# Patient Record
Sex: FEMALE | Race: WHITE | NOT HISPANIC OR LATINO | Employment: UNEMPLOYED | URBAN - METROPOLITAN AREA
[De-identification: names, ages, dates, MRNs, and addresses within clinical notes are randomized per-mention and may not be internally consistent; named-entity substitution may affect disease eponyms.]

---

## 2018-05-18 ENCOUNTER — OFFICE VISIT (OUTPATIENT)
Dept: URGENT CARE | Facility: PHYSICIAN GROUP | Age: 73
End: 2018-05-18

## 2018-05-18 VITALS
HEART RATE: 78 BPM | DIASTOLIC BLOOD PRESSURE: 62 MMHG | TEMPERATURE: 98.2 F | HEIGHT: 64 IN | BODY MASS INDEX: 22.88 KG/M2 | WEIGHT: 134 LBS | OXYGEN SATURATION: 98 % | RESPIRATION RATE: 16 BRPM | SYSTOLIC BLOOD PRESSURE: 126 MMHG

## 2018-05-18 DIAGNOSIS — Z76.0 MEDICATION REFILL: ICD-10-CM

## 2018-05-18 DIAGNOSIS — N39.0 ACUTE UTI: ICD-10-CM

## 2018-05-18 LAB
APPEARANCE UR: NORMAL
BILIRUB UR STRIP-MCNC: NORMAL MG/DL
COLOR UR AUTO: YELLOW
GLUCOSE UR STRIP.AUTO-MCNC: NORMAL MG/DL
KETONES UR STRIP.AUTO-MCNC: NORMAL MG/DL
LEUKOCYTE ESTERASE UR QL STRIP.AUTO: NORMAL
NITRITE UR QL STRIP.AUTO: NORMAL
PH UR STRIP.AUTO: 5 [PH] (ref 5–8)
PROT UR QL STRIP: NORMAL MG/DL
RBC UR QL AUTO: NORMAL
SP GR UR STRIP.AUTO: 1.02
UROBILINOGEN UR STRIP-MCNC: NORMAL MG/DL

## 2018-05-18 PROCEDURE — 99203 OFFICE O/P NEW LOW 30 MIN: CPT | Mod: 25 | Performed by: NURSE PRACTITIONER

## 2018-05-18 PROCEDURE — 81002 URINALYSIS NONAUTO W/O SCOPE: CPT | Performed by: NURSE PRACTITIONER

## 2018-05-18 RX ORDER — PHENAZOPYRIDINE HYDROCHLORIDE 100 MG/1
100 TABLET, FILM COATED ORAL 3 TIMES DAILY PRN
Qty: 6 TAB | Refills: 0 | Status: SHIPPED | OUTPATIENT
Start: 2018-05-18

## 2018-05-18 RX ORDER — PHENAZOPYRIDINE HYDROCHLORIDE 100 MG/1
100 TABLET, FILM COATED ORAL 3 TIMES DAILY PRN
Qty: 6 TAB | Refills: 0 | Status: SHIPPED | OUTPATIENT
Start: 2018-05-18 | End: 2018-05-18 | Stop reason: SDUPTHER

## 2018-05-18 RX ORDER — NITROFURANTOIN 25; 75 MG/1; MG/1
100 CAPSULE ORAL 2 TIMES DAILY
Qty: 14 CAP | Refills: 0 | Status: SHIPPED | OUTPATIENT
Start: 2018-05-18

## 2018-05-18 RX ORDER — NITROFURANTOIN 25; 75 MG/1; MG/1
100 CAPSULE ORAL 2 TIMES DAILY
Qty: 14 CAP | Refills: 0 | Status: SHIPPED | OUTPATIENT
Start: 2018-05-18 | End: 2018-05-18 | Stop reason: SDUPTHER

## 2018-05-18 RX ORDER — FUROSEMIDE 20 MG/1
20 TABLET ORAL DAILY
Qty: 5 TAB | Refills: 0 | Status: SHIPPED | OUTPATIENT
Start: 2018-05-18

## 2018-05-18 RX ORDER — SPIRONOLACTONE 50 MG/1
50 TABLET, FILM COATED ORAL DAILY
Qty: 5 TAB | Refills: 3 | Status: SHIPPED | OUTPATIENT
Start: 2018-05-18 | End: 2018-05-18 | Stop reason: SDUPTHER

## 2018-05-18 RX ORDER — SPIRONOLACTONE 50 MG/1
50 TABLET, FILM COATED ORAL DAILY
Qty: 5 TAB | Refills: 3 | Status: SHIPPED | OUTPATIENT
Start: 2018-05-18

## 2018-05-18 RX ORDER — FUROSEMIDE 20 MG/1
20 TABLET ORAL 2 TIMES DAILY
COMMUNITY
End: 2018-05-18

## 2018-05-18 RX ORDER — FUROSEMIDE 20 MG/1
20 TABLET ORAL DAILY
Qty: 5 TAB | Refills: 0 | Status: SHIPPED | OUTPATIENT
Start: 2018-05-18 | End: 2018-05-18 | Stop reason: SDUPTHER

## 2018-05-18 ASSESSMENT — ENCOUNTER SYMPTOMS
CHILLS: 0
BACK PAIN: 0
FEVER: 0
HEADACHES: 0
FLANK PAIN: 0
NAUSEA: 0
WEAKNESS: 0
VOMITING: 0
ABDOMINAL PAIN: 0
MYALGIAS: 0

## 2018-05-19 NOTE — PROGRESS NOTES
"Subjective:      Jerome Herrera is a 73 y.o. female who presents with Painful Urination            Medications, Allergies and Prior Medical Hx reviewed and updated in [facility].with patient/family today       Patient currently lives in [place]. She is here visiting for a graduation. She has 2 issues today. One is a urinary tract infection symptoms. The second issue is she Left some of her medications at home. Medications are from [place] and in [place]. States she had a heart attack. 4 weeks ago. This time is to bowel and her chest felt up with fluid.      Dysuria    This is a new problem. The current episode started 1 to 4 weeks ago (4 wks). The problem has been waxing and waning. The quality of the pain is described as burning. The pain is at a severity of 8/10. There has been no fever. Associated symptoms include frequency and urgency. Pertinent negatives include no chills, flank pain, hematuria, nausea or vomiting. Her past medical history is significant for catheterization.       Review of Systems   Constitutional: Negative for chills, fever and malaise/fatigue.   Gastrointestinal: Negative for abdominal pain, nausea and vomiting.   Genitourinary: Positive for dysuria, frequency and urgency. Negative for flank pain and hematuria.   Musculoskeletal: Negative for back pain and myalgias.   Neurological: Negative for weakness and headaches.          Objective:     /62   Pulse 78   Temp 36.8 °C (98.2 °F)   Resp 16   Ht 1.626 m (5' 4\")   Wt 60.8 kg (134 lb)   SpO2 98%   BMI 23.00 kg/m²      Physical Exam   Constitutional: She appears well-developed and well-nourished.   HENT:   Head: Normocephalic and atraumatic.   Eyes: Pupils are equal, round, and reactive to light.   Cardiovascular: Normal rate, regular rhythm and normal heart sounds.    Pulmonary/Chest: Effort normal and breath sounds normal. No respiratory distress.   Abdominal: Soft. She exhibits no distension. There is no tenderness.   Musculoskeletal: " She exhibits edema (+1 bilateral lower legs).   Neurological: She is alert.   Awake, alert, answering questions appropriately, moving all extremeties   Skin: Skin is warm and dry. Capillary refill takes less than 2 seconds.   Vitals reviewed.              Assessment/Plan:     1. Medication refill  furosemide (LASIX) 20 MG Tab    spironolactone (ALDACTONE) 50 MG Tab    DISCONTINUED: spironolactone (ALDACTONE) 50 MG Tab    DISCONTINUED: furosemide (LASIX) 20 MG Tab   2. Acute UTI  nitrofurantoin monohydr macro (MACROBID) 100 MG Cap    phenazopyridine (PYRIDIUM) 100 MG Tab    POCT Urinalysis    DISCONTINUED: nitrofurantoin monohydr macro (MACROBID) 100 MG Cap    DISCONTINUED: phenazopyridine (PYRIDIUM) 100 MG Tab       Results for KYLEE VERDIN (MRN [id number]) as of 5/18/2018 20:30   5/18/2018 17:20   POC Color yellow   POC Appearance cloudy   POC Specific Gravity 1.020   POC Urine PH 5.0   POC Glucose neg   POC Ketones neg   POC Protein neg   POC Nitrites neg   POC Leukocyte Esterase moderate   POC Blood neg   POC Bilirubin neg   POC Urobiligen neg       Patient was placed on Macrobid due to allergies to sulfa and penicillins. And recent cardiac issues..    Patient has to medication sheet like refilled both her research On the Internet. One was Lasix and spironolactone. The other which he states is for her bladder pain could not be found with a [place] equivalent. Patient is returning home in 3 days. She is given 5 days. Prescription for Lasix and spironolactone. She was given Pyridium for her bladder pain.    Drink fluids  Pt will go to the ER for worsening or changing symptoms as discusse, high fever, body aches, vomiting, flank pain shortness of breath, chest pain or any other concerns  Follow-up with your primary care provider or return here if not improving in 5 days   Discharge instructions discussed with pt/family who verbalize understanding and agreement with poc